# Patient Record
Sex: MALE | Race: WHITE | NOT HISPANIC OR LATINO | Employment: OTHER | URBAN - METROPOLITAN AREA
[De-identification: names, ages, dates, MRNs, and addresses within clinical notes are randomized per-mention and may not be internally consistent; named-entity substitution may affect disease eponyms.]

---

## 2017-07-29 ENCOUNTER — HOSPITAL ENCOUNTER (EMERGENCY)
Facility: CLINIC | Age: 72
Discharge: HOME | End: 2017-07-29
Attending: PHYSICIAN ASSISTANT | Admitting: PHYSICIAN ASSISTANT
Payer: COMMERCIAL

## 2017-07-29 VITALS
RESPIRATION RATE: 16 BRPM | SYSTOLIC BLOOD PRESSURE: 143 MMHG | BODY MASS INDEX: 28.14 KG/M2 | DIASTOLIC BLOOD PRESSURE: 70 MMHG | OXYGEN SATURATION: 97 % | WEIGHT: 190 LBS | TEMPERATURE: 98.8 F | HEIGHT: 69 IN

## 2017-07-29 DIAGNOSIS — S41.111A ARM LACERATION, RIGHT, INITIAL ENCOUNTER: ICD-10-CM

## 2017-07-29 PROCEDURE — 99283 EMERGENCY DEPT VISIT LOW MDM: CPT | Mod: 25

## 2017-07-29 PROCEDURE — 12002 RPR S/N/AX/GEN/TRNK2.6-7.5CM: CPT

## 2017-07-29 PROCEDURE — 90471 IMMUNIZATION ADMIN: CPT

## 2017-07-29 PROCEDURE — 25000128 H RX IP 250 OP 636

## 2017-07-29 PROCEDURE — 90715 TDAP VACCINE 7 YRS/> IM: CPT

## 2017-07-29 ASSESSMENT — ENCOUNTER SYMPTOMS
NUMBNESS: 0
WOUND: 1

## 2017-07-29 NOTE — ED PROVIDER NOTES
"  History     Chief Complaint:  Laceration     HPI   Kt Cornell is a 71 year old male who presents to the emergency department today for evaluation of a laceration. Just prior to arrival, the patient was changing a toilet when it broke cutting the patient's right forearm. The area bleeding immediately, however, is somewhat controlled with direct pressure. He is on a baby aspirin, but no other anticoagulation medications. He denies any pain and numbness or tingling in his fingers. Of note, he is unsure when his last tetanus was.     Allergies:  Penicillins     Medications:    Aspirin     Past Medical History:    History reviewed. No pertinent past medical history.    Past Surgical History:    History reviewed. No pertinent past surgical history.    Family History:    History reviewed. No pertinent family history.     Social History:  The patient was accompanied to the ED by his wife.  Marital Status:        Review of Systems   Skin: Positive for wound (laceration right wrist).   Neurological: Negative for numbness.   All other systems reviewed and are negative.    Physical Exam   First Vitals:  BP: 143/70  Heart Rate: 75  Temp: 98.8  F (37.1  C)  Resp: 16  Height: 175.3 cm (5' 9\")  Weight: 86.2 kg (190 lb)  SpO2: 97 %      Physical Exam  General: Resting comfortably on the gurney.    Resp:  Non-labored breathing. No tachypnea.   CV:  Radial pulses 2+ on the right     Capillary refill less than two seconds right fingers.    MS:  5/5 strength with right , wrist flexion and extension, finger flexion and extension at the MCP, PIP and DIP joints.      Normal ROM with right wrist flexion and extension, finger flexion and extension at the MCP, PIP and DIP joints.     No tenderness with palpation.   Neuro:  Awake and alert.     Sensation intact to light touch in the median, ulnar and radial nerve distributions as well as distal to the injury.    Skin:  Right forearm, flexor aspect, there is a 5 cm linear " laceration with active bleeding.    Psych: Normal affect. Appropriate interactions.      Emergency Department Course     Procedures:     Laceration Repair      LACERATION:  A simple clean 5 cm laceration.    LOCATION:  Right forearm     FUNCTION:  Distally sensation, circulation, motor and tendon function are intact.    ANESTHESIA:  Local using 1% Lidocaine with Epi total of 4 mLs.    PREPARATION:  Irrigation and Scrubbing with Shur Clens and Normal Saline.    DEBRIDEMENT:  no debridement.    CLOSURE:  Wound was closed with One Layer.  Skin closed with 21 x 5.0 Ethylon using interrupted sutures.     Interventions:  1457 Tdap 0.5mLs IM      Emergency Department Course:  Nursing notes and vitals reviewed.  1400: I performed an exam of the patient as documented above.   1454: Laceration repair completed.   I discussed the treatment plan with the patient. They expressed understanding of this plan and consented to discharge. They will be discharged home with instructions for care and follow up. In addition, the patient will return to the emergency department if their symptoms persist, worsen, if new symptoms arise or if there is any concern.  All questions were answered.    Impression & Plan      Medical Decision Making:  Kt presented with a laceration.  The wound was carefully evaluated and explored.  The laceration was closed as noted in the procedure note.  There is no evidence of muscular, tendon, bone, or nerve damage with this laceration.  Possible complications (infection, scarring) were reviewed with the patient.  Follow up with primary care will be indicated for suture removal as noted in the discharge instructions. I discussed the results, plan and any additional questions with the patient and his wife. They verbalized understanding and agreement with the plan.  We discussed reasons to return to the ED including signs of infection (warmth, redness, drainage, fever).      Diagnosis:    ICD-10-CM    1. Arm  laceration, right, initial encounter S41.111A      Disposition:  Discharged to home    Scribe Disclosure:  I, Christina Erickson, am serving as a scribe at 1:54 PM on 7/29/2017 to document services personally performed by Viktoria Lake MD based on my observations and the provider's statements to me.     7/29/2017    EMERGENCY DEPARTMENT       Viktoria Lake PAROB  07/29/17 [id number]

## 2017-07-29 NOTE — ED AVS SNAPSHOT
Emergency Department    [address] [facility] [address]    Phone:  [phone]    Fax:  [phone]                                       Kt Cornell   MRN: [id number]    Department:   Emergency Department   Date of Visit:  7/29/2017           Patient Information     Date Of Birth          1945        Your diagnoses for this visit were:     Arm laceration, right, initial encounter        You were seen by Viktoria Lake PA-C.      Follow-up Information     Follow up with Ady Randall MD In 10 days.    Specialty:  Internal Medicine    Contact information:    [facility] [address]  [phone]          Follow up with  Emergency Department.    Specialty:  EMERGENCY MEDICINE    Why:  If symptoms worsen    Contact information:    [address] [facility] [address] [phone]        Discharge Instructions       Keep the lacerations clean.  You may wash with soap and water and apply neosporin/bacitracin.  No soaking or swimming.    Acetaminophen and/or Ibuprofen as directed as needed for pain.  Follow-up with your doctor for suture removal in ~10-14 days.    Return if any symptoms of infection including fever, worsening pain, foul-smelling drainage, spreading redness or warmth or worse in any way.     Discharge Instructions  Laceration (Cut)    You were seen today for a laceration (cut).  Your doctor examined your laceration for any problems such a buried foreign body (like glass, a splinter, or gravel), or injury to blood vessels, tendons, and nerves.  Your doctor may have also rinsed and/or scrubbed your laceration to help prevent an infection.  Your laceration may have been closed with glue, staples or sutures (stitches).      It may not be possible to find all problems with your laceration on the first visit, and we can't always prevent infections.  Antibiotics are only given when the benefit is more than the risk,  and don't prevent all infections. Some lacerations are too high risk to close, and are left open to heal.  All lacerations, no matter how expertly repaired, will cause scarring.    Return to the Emergency Department right away if:    You have more redness, swelling, pain, drainage (pus), a bad smell, or red streaking from your laceration.      You have a fever of 101oF or more.    You have bleeding that you can t stop at home. If your cut starts to bleed, hold pressure on the bleeding area with a clean cloth or put pressure over the bandage.  If the bleeding doesn t stop after using constant pressure for 30 minutes, you should return to the Emergency Department for further treatment.    An area past the laceration is cool, pale, or blue compared with the other side, or has a slower return of color when squeezed.    Your dressing seems too tight or starts to get uncomfortable or painful.    You have loss of normal function or use of an area, such as being unable to straighten or bend a finger normally.    You have a numb area past the laceration.    Return to the Emergency Department or see your regular doctor if:    The laceration starts to come open.     You have something coming out of the cut or a feeling that there is something in the laceration.    Your wound will not heal, or keeps breaking open. There can always be glass, wood, dirt or other things in any wound.  They won t always show up, even on x-rays.  If a wound doesn t heal, this may be why, and it is important to follow-up with your regular doctor.    Home Care:    Take your dressing off in 12 hours, or as instructed by your doctor, to check your laceration. Remove the dressing sooner if it seems too tight or painful, or if it is getting numb, tingly, or pale past the dressing.    Gently wash your laceration 2 times a day with clean cloth and soap.     It is okay to shower, but do not let the laceration soak in water.      If your laceration was closed  "with wound adhesive or strips: pat it dry and leave it open to the air.     For all other repairs: after you wash your laceration, or at least 2 times a day, apply bacitracin or other antibiotic ointment to the laceration, then cover it with a Band-Aid  or gauze.    Keep the laceration clean. Wear gloves or other protective clothing if you are around dirt.    Follow-up:    Your sutures or staples need to be removed in 10-14 days. Schedule an appointment with your regular doctor to have this done.    Scars:  To help minimize scarring:    Wear sunscreen over the healed laceration when out in the sun.    Massage the area regularly.    You may use Vitamin E oil.    Wait a year.  Most scars will start to fade within a year.    Probiotics: If you have been given an antibiotic, you may want to also take a probiotic pill or eat yogurt with live cultures. Probiotics have \"good bacteria\" to help your intestines stay healthy. Studies have shown that probiotics help prevent diarrhea and other intestine problems (including C. diff infection) when you take antibiotics. You can buy these without a prescription in the pharmacy section of the store.     If you were given a prescription for medicine here today, be sure to read all of the information (including the package insert) that comes with your prescription.  This will include important information about the medicine, its side effects, and any warnings that you need to know about.  The pharmacist who fills the prescription can provide more information and answer questions you may have about the medicine.  If you have questions or concerns that the pharmacist cannot address, please call or return to the Emergency Department.         Remember that you can always come back to the Emergency Department if you are not able to see your regular doctor in the amount of time listed above, if you get any new symptoms, or if there is anything that worries you.          24 Hour Appointment " Hotline       To make an appointment at any [facility], call [phone] ([phone]). If you don't have a family doctor or clinic, we will help you find one. [facility] are conveniently located to serve the needs of you and your family.             Review of your medicines      Notice     You have not been prescribed any medications.            Orders Needing Specimen Collection     None      Pending Results     No orders found from 7/27/2017 to 7/30/2017.            Pending Culture Results     No orders found from 7/27/2017 to 7/30/2017.            Pending Results Instructions     If you had any lab results that were not finalized at the time of your Discharge, you can call the ED Lab Result RN at [phone]. You will be contacted by this team for any positive Lab results or changes in treatment. The nurses are available 7 days a week from 10A to 6:30P.  You can leave a message 24 hours per day and they will return your call.        Test Results From Your Hospital Stay               Clinical Quality Measure: Blood Pressure Screening     Your blood pressure was checked while you were in the emergency department today. The last reading we obtained was  BP: 143/70 . Please read the guidelines below about what these numbers mean and what you should do about them.  If your systolic blood pressure (the top number) is less than 120 and your diastolic blood pressure (the bottom number) is less than 80, then your blood pressure is normal. There is nothing more that you need to do about it.  If your systolic blood pressure (the top number) is 120-139 or your diastolic blood pressure (the bottom number) is 80-89, your blood pressure may be higher than it should be. You should have your blood pressure rechecked within a year by a primary care provider.  If your systolic blood pressure (the top number) is 140 or greater or your diastolic blood pressure (the bottom number) is 90 or greater, you may have high  "blood pressure. High blood pressure is treatable, but if left untreated over time it can put you at risk for heart attack, stroke, or kidney failure. You should have your blood pressure rechecked by a primary care provider within the next 4 weeks.  If your provider in the emergency department today gave you specific instructions to follow-up with your doctor or provider even sooner than that, you should follow that instruction and not wait for up to 4 weeks for your follow-up visit.        Thank you for choosing [facility]       Thank you for choosing [facility] for your care. Our goal is always to provide you with excellent care. Hearing back from our patients is one way we can continue to improve our services. Please take a few minutes to complete the written survey that you may receive in the mail after you visit with us. Thank you!        [employer] Information     [employer] lets you send messages to your doctor, view your test results, renew your prescriptions, schedule appointments and more. To sign up, go to [email or web address] . Click on \"Log in\" on the left side of the screen, which will take you to the Welcome page. Then click on \"Sign up Now\" on the right side of the page.     You will be asked to enter the access code listed below, as well as some personal information. Please follow the directions to create your username and password.     Your access code is: [email or web address]  Expires: 10/27/2017  3:01 PM     Your access code will  in 90 days. If you need help or a new code, please call your [facility] clinic or [phone].        Care EveryWhere ID     This is your Care EveryWhere ID. This could be used by other organizations to access your [facility] medical records  [id number]        Equal Access to Services     YUDITH GARRETT : rosa Matta, tuan reynoso. So [facility] [phone].    ATENCIÓN: Si henryla espjennifer chappell " mcguire disposición servicios gratuitos de asistencia lingüística. Llame al [phone].    We comply with applicable federal civil rights laws and [place] laws. We do not discriminate on the basis of race, color, national origin, age, disability sex, sexual orientation or gender identity.            After Visit Summary       This is your record. Keep this with you and show to your community pharmacist(s) and doctor(s) at your next visit.

## 2017-07-29 NOTE — DISCHARGE INSTRUCTIONS
Keep the lacerations clean.  You may wash with soap and water and apply neosporin/bacitracin.  No soaking or swimming.    Acetaminophen and/or Ibuprofen as directed as needed for pain.  Follow-up with your doctor for suture removal in ~10-14 days.    Return if any symptoms of infection including fever, worsening pain, foul-smelling drainage, spreading redness or warmth or worse in any way.     Discharge Instructions  Laceration (Cut)    You were seen today for a laceration (cut).  Your doctor examined your laceration for any problems such a buried foreign body (like glass, a splinter, or gravel), or injury to blood vessels, tendons, and nerves.  Your doctor may have also rinsed and/or scrubbed your laceration to help prevent an infection.  Your laceration may have been closed with glue, staples or sutures (stitches).      It may not be possible to find all problems with your laceration on the first visit, and we can't always prevent infections.  Antibiotics are only given when the benefit is more than the risk, and don't prevent all infections. Some lacerations are too high risk to close, and are left open to heal.  All lacerations, no matter how expertly repaired, will cause scarring.    Return to the Emergency Department right away if:    You have more redness, swelling, pain, drainage (pus), a bad smell, or red streaking from your laceration.      You have a fever of 101oF or more.    You have bleeding that you can t stop at home. If your cut starts to bleed, hold pressure on the bleeding area with a clean cloth or put pressure over the bandage.  If the bleeding doesn t stop after using constant pressure for 30 minutes, you should return to the Emergency Department for further treatment.    An area past the laceration is cool, pale, or blue compared with the other side, or has a slower return of color when squeezed.    Your dressing seems too tight or starts to get uncomfortable or painful.    You have loss of  "normal function or use of an area, such as being unable to straighten or bend a finger normally.    You have a numb area past the laceration.    Return to the Emergency Department or see your regular doctor if:    The laceration starts to come open.     You have something coming out of the cut or a feeling that there is something in the laceration.    Your wound will not heal, or keeps breaking open. There can always be glass, wood, dirt or other things in any wound.  They won t always show up, even on x-rays.  If a wound doesn t heal, this may be why, and it is important to follow-up with your regular doctor.    Home Care:    Take your dressing off in 12 hours, or as instructed by your doctor, to check your laceration. Remove the dressing sooner if it seems too tight or painful, or if it is getting numb, tingly, or pale past the dressing.    Gently wash your laceration 2 times a day with clean cloth and soap.     It is okay to shower, but do not let the laceration soak in water.      If your laceration was closed with wound adhesive or strips: pat it dry and leave it open to the air.     For all other repairs: after you wash your laceration, or at least 2 times a day, apply bacitracin or other antibiotic ointment to the laceration, then cover it with a Band-Aid  or gauze.    Keep the laceration clean. Wear gloves or other protective clothing if you are around dirt.    Follow-up:    Your sutures or staples need to be removed in 10-14 days. Schedule an appointment with your regular doctor to have this done.    Scars:  To help minimize scarring:    Wear sunscreen over the healed laceration when out in the sun.    Massage the area regularly.    You may use Vitamin E oil.    Wait a year.  Most scars will start to fade within a year.    Probiotics: If you have been given an antibiotic, you may want to also take a probiotic pill or eat yogurt with live cultures. Probiotics have \"good bacteria\" to help your intestines stay " healthy. Studies have shown that probiotics help prevent diarrhea and other intestine problems (including C. diff infection) when you take antibiotics. You can buy these without a prescription in the pharmacy section of the store.     If you were given a prescription for medicine here today, be sure to read all of the information (including the package insert) that comes with your prescription.  This will include important information about the medicine, its side effects, and any warnings that you need to know about.  The pharmacist who fills the prescription can provide more information and answer questions you may have about the medicine.  If you have questions or concerns that the pharmacist cannot address, please call or return to the Emergency Department.         Remember that you can always come back to the Emergency Department if you are not able to see your regular doctor in the amount of time listed above, if you get any new symptoms, or if there is anything that worries you.

## 2017-07-29 NOTE — ED AVS SNAPSHOT
Emergency Department    [address] [facility] [address]    Phone:  [phone]    Fax:  [phone]                                       Kt Cornell   MRN: [id number]    Department:   Emergency Department   Date of Visit:  7/29/2017           After Visit Summary Signature Page     I have received my discharge instructions, and my questions have been answered. I have discussed any challenges I see with this plan with the nurse or doctor.    ..........................................................................................................................................  Patient/Patient Representative Signature      ..........................................................................................................................................  Patient Representative Print Name and Relationship to Patient    ..................................................               ................................................  Date                                            Time    ..........................................................................................................................................  Reviewed by Signature/Title    ...................................................              ..............................................  Date                                                            Time

## 2018-04-19 ENCOUNTER — TRANSFERRED RECORDS (OUTPATIENT)
Dept: HEALTH INFORMATION MANAGEMENT | Facility: CLINIC | Age: 73
End: 2018-04-19

## 2018-04-19 ENCOUNTER — MEDICAL CORRESPONDENCE (OUTPATIENT)
Dept: HEALTH INFORMATION MANAGEMENT | Facility: CLINIC | Age: 73
End: 2018-04-19

## 2018-06-16 NOTE — TELEPHONE ENCOUNTER
FUTURE VISIT INFORMATION      FUTURE VISIT INFORMATION:    Date: 06/26/18    Time: 145pm    Location: [facility]  REFERRAL INFORMATION:    Referring provider:  REYNOLD BARON    Referring providers clinic:  [facility]    Reason for visit/diagnosis  ectropian rll    RECORDS REQUESTED FROM:       Clinic name Comments Records Status Imaging Status   [facility] Notes in HIM In HIM                   Patent

## 2018-06-26 ENCOUNTER — OFFICE VISIT (OUTPATIENT)
Dept: OPHTHALMOLOGY | Facility: CLINIC | Age: 73
End: 2018-06-26
Payer: COMMERCIAL

## 2018-06-26 ENCOUNTER — PRE VISIT (OUTPATIENT)
Dept: OPHTHALMOLOGY | Facility: CLINIC | Age: 73
End: 2018-06-26

## 2018-06-26 VITALS — HEIGHT: 70 IN | BODY MASS INDEX: 27.92 KG/M2 | WEIGHT: 195 LBS

## 2018-06-26 DIAGNOSIS — H02.112 CICATRICIAL ECTROPION OF RIGHT LOWER EYELID: Primary | ICD-10-CM

## 2018-06-26 ASSESSMENT — VISUAL ACUITY
OS_SC+: -3
METHOD: SNELLEN - LINEAR
OD_CC: 20/20
OS_CC: 20/40
OS_SC: 20/20
CORRECTION_TYPE: GLASSES

## 2018-06-26 ASSESSMENT — TONOMETRY
OS_IOP_MMHG: 11
OD_IOP_MMHG: 11
IOP_METHOD: ICARE

## 2018-06-26 ASSESSMENT — EXTERNAL EXAM - RIGHT EYE: OD_EXAM: NORMAL

## 2018-06-26 ASSESSMENT — SLIT LAMP EXAM - LIDS: COMMENTS: NORMAL

## 2018-06-26 ASSESSMENT — EXTERNAL EXAM - LEFT EYE: OS_EXAM: NORMAL

## 2018-06-26 NOTE — NURSING NOTE
Chief Complaints and History of Present Illnesses   Patient presents with     Ectropion Evaluation     HPI    Affected eye(s):  Right   Location:  Lower   Symptoms:     No blurred vision   Tearing (Comment: occasionally per pt but feels it is nothing out of the ordinary )   Dryness   No itching   No burning         Do you have eye pain now?:  No      Comments:    Patient notes he had a growth removed from the RLL about 1 year ago and since that sx he has had RLL issues. Patient notes he has been having a pain in the RE that comes and goes that feels like a muscle ache x a couple months.    Yulissa Posey June 26, 2018 12:52 PM

## 2018-06-26 NOTE — MR AVS SNAPSHOT
After Visit Summary   2018    Kt Cornell    MRN: [id number]           Patient Information     Date Of Birth          1945        Visit Information        Provider Department      2018 1:45 PM Lili Osman MD [facility] Ophthalmology        Today's Diagnoses     Cicatricial ectropion of right lower eyelid    -  1       Follow-ups after your visit        Follow-up notes from your care team     Return for 2 weeks procedure 45 minutes ok at [facility].      Your next 10 appointments already scheduled     2018 12:45 PM CDT   PROCEDURE with Lili Osman MD   [facility] ([facility])    [address] [address] [place] [address] [phone]              Who to contact     Please call your clinic at [phone] to:    Ask questions about your health    Make or cancel appointments    Discuss your medicines    Learn about your test results    Speak to your doctor            Additional Information About Your Visit        MyChart Information     MobileGlobe is an electronic gateway that provides easy, online access to your medical records. With MobileGlobe, you can request a clinic appointment, read your test results, renew a prescription or communicate with your care team.     To sign up for Virtual Computert visit the website at [email or web address]   You will be asked to enter the access code listed below, as well as some personal information. Please follow the directions to create your username and password.     Your access code is: [email or web address]  Expires: 9/10/2018  6:30 AM     Your access code will  in 90 days. If you need help or a new code, please contact your [facility] or call [phone] for assistance.        Care EveryWhere ID     This is your Care EveryWhere ID. This could be used by other organizations to access your [facility] medical records  [id number]        Your Vitals Were     Height BMI (Body  "Mass Index)                1.765 m (5' 9.5\") 28.38 kg/m2           Blood Pressure from Last 3 Encounters:   07/29/17 143/70    Weight from Last 3 Encounters:   06/26/18 88.5 kg (195 lb)   07/29/17 86.2 kg (190 lb)              We Performed the Following     External Photos OU (both eyes)          Today's Medication Changes          These changes are accurate as of 6/26/18  1:56 PM.  If you have any questions, ask your nurse or doctor.               Stop taking these medicines if you haven't already. Please contact your care team if you have questions.     SERTRALINE HCL PO   Stopped by:  Lili Osman MD                    Primary Care Provider Office Phone # Fax #    Ady Randall [phone]       [facility] [address]        Equal Access to Services     [facility]: Eliseo Cortez, rosa colon, renee foote, tuan adler . So [facility] [phone].    ATENCIÓN: Si habla español, tiene a mcguire disposición servicios gratuitos de asistencia lingüística. Llame al [phone].    We comply with applicable federal civil rights laws and [place] laws. We do not discriminate on the basis of race, color, national origin, age, disability, sex, sexual orientation, or gender identity.            Thank you!     Thank you for choosing [facility] OPHTHALMOLOGY  for your care. Our goal is always to provide you with excellent care. Hearing back from our patients is one way we can continue to improve our services. Please take a few minutes to complete the written survey that you may receive in the mail after your visit with us. Thank you!             Your Updated Medication List - Protect others around you: Learn how to safely use, store and throw away your medicines at www.disposemymeds.org.          This list is accurate as of 6/26/18  1:56 PM.  Always use your most recent med list.                   Brand Name " Dispense Instructions for use Diagnosis    GLIPIZIDE PO           METFORMIN HCL PO

## 2018-06-26 NOTE — LETTER
2018         RE:  :  MRN: Kt Cornell  1945  [id number]     Dear Isabel Chapa,    Thank you for asking me to see your patient, Kt Cornell, for an oculoplastic   consultation.  My assessment and plan are below.  For further details, please see my attached clinic note.      Chief Complaints and History of Present Illnesses   Patient presents with     Ectropion Evaluation      [facility]  S/P bilateral upper lid blepharoplasty. During this procedure, patient reports that he had a eyelid lesion removed from his lower eyelid about a year ago and then slowly developed this lower eyelid turning outward.   Reports that he has pain at times, irritation at times, and tearing at times.  Patient is on baby aspirin and uses CPAP.     Assessment & Plan        Kt Cornell is a 72 year old male with the following diagnoses:   1. Cicatricial ectropion of right lower eyelid       PLAN:  Lateral tarsal strip, right lower eyelid         Danis Hebert MD  PGY-2 Ophthalmology  Agree with above  Discussed options. It is possible if not likely he will need an FTSG, but he is willing to try lts and if fails proceed with FTSG.      OK with in clinic procedure.   Right lower lid LTS ectropion repair. Use PDS.     Again, thank you for allowing me to participate in the care of your patient.      Sincerely,    Lili Osman MD  Department of Ophthalmology and Visual Neurosciences  [facility]    CC: Isabel Chapa, LUCINA  [facility]  [facility] [address]  VIA Mail

## 2018-06-26 NOTE — PROGRESS NOTES
Chief Complaints and History of Present Illnesses   Patient presents with     Ectropion Evaluation     [facility].  S/P bilateral upper lid blepharoplasty. During this procedure, patient reports that he had a eyelid lesion removed from his lower eyelid about a year ago and then slowly developed this lower eyelid turning outward.   Reports that he has pain at times, irritation at times, and tearing at times.  Patient is on baby aspirin and uses CPAP.       Kt Cornell is a 72 year old male with the following diagnoses:   1. Cicatricial ectropion of right lower eyelid      PLAN:  Lateral tarsal strip, right lower eyelid        Danis Hebert MD  PGY-2 Ophthalmology  Agree with above  Discussed options. It is possible if not likely he will need an FTSG, but he is willing to try lts and if fails proceed with FTSG.     OK with in clinic procedure.   Right lower lid LTS ectropion repair. Use PDS.     Attending Physician Attestation:  Complete documentation of historical and exam elements from today's encounter can be found in the full encounter summary report (not reduplicated in this progress note).  I personally obtained the chief complaint(s) and history of present illness.  I confirmed and edited as necessary the review of systems, past medical/surgical history, family history, social history, and examination findings as documented by others; and I examined the patient myself.  I personally reviewed the relevant tests, images, and reports as documented above.  I formulated and edited as necessary the assessment and plan and discussed the findings and management plan with the patient and family. I personally reviewed the ophthalmic test(s) associated with this encounter, agree with the interpretation(s) as documented by the resident/fellow, and have edited the corresponding report(s) as necessary. - Lili Osman MD

## 2018-07-11 ENCOUNTER — OFFICE VISIT (OUTPATIENT)
Dept: OPHTHALMOLOGY | Facility: CLINIC | Age: 73
End: 2018-07-11
Payer: COMMERCIAL

## 2018-07-11 DIAGNOSIS — H02.112 CICATRICIAL ECTROPION OF RIGHT LOWER EYELID: Primary | ICD-10-CM

## 2018-07-11 PROCEDURE — 67917 REPAIR EYELID DEFECT: CPT | Mod: RT | Performed by: OPHTHALMOLOGY

## 2018-07-11 NOTE — PROGRESS NOTES
PREOPERATIVE DIAGNOSIS: Right lower eyelid ectropion  POSTOPERATIVE DIAGNOSIS: Right lower eyelid ectropion  PROCEDURE:  Right lower eyelid ectropion repair by tarsal strip procedure   ANESTHESIA: 1% lidocaine with epinephrine  SURGEON: Lili Osman MD.  ASSISTANT: None  COMPLICATIONS: None.   ESTIMATED BLOOD LOSS: Less than 5 mL.   HISTORY: Kt Cornell  presented with tearing  due to lower lid ectropion. The ectropion had previously been repaired with recurrence. We discussed if it fails repair, he may require a skin graft. After the risks, benefits and alternatives to the proposed procedure were explained, informed consent was obtained.   DESCRIPTION OF PROCEDURE: Kt Cornell was brought to the operating room and placed supine on the operating table.he lower lids and lateral canthal areas were infiltrated with local anesthetic. The area was prepped and draped in the typical fashion. Attention was directed to the right side. Lateral canthotomy and inferior cantholysis was performed with Benita scissors. A lateral tarsal strip was fashioned with the high temperature cautery and the benita scissors. The tarsal strip was secured to the lateral orbital rim periosteum with a double-armed 5-0 PDS suture in a horizontal mattress fashion. Lateral canthal angle was closed with a gray line to gray line suture of 5-0 Vicryl tied internally.The patient tolerated the procedure well. Antibiotic ointment was applied to the incisions. Kt Cronell left the operating room in stable condition.   Lili Osman MD

## 2018-07-11 NOTE — PATIENT INSTRUCTIONS
Post-operative Instructions  Ophthalmic Plastic and Reconstructive Surgery    Lili Osman M.D.     All instructions apply to the operated eye(s) or eyelid(s).    Wound care and personal care  ? If a patch or bandage has been placed, please leave this in place until seen by your physician. Ensure that the bandage does not get wet when you take a shower.  ? Apply ice compresses 15 minutes on 15 minutes off while awake for 2 days, then switch to warm water compresses 4 times a day until seen by your physician. For warm packs you can place a cup of dry uncooked rice in a clean cotton sock. Then place sock in microwave 30 seconds to one minute. Next place the warm sock into a plastic bag and wrap the bag with clean warm wet washcloth and place over operated eye.    ? You may shower or wash your hair the day after surgery. Do not bathe or go swimming for 1 week to prevent contamination of your wounds.  ? Do not apply make-up to the eyes or eyelids for 2 weeks after surgery.  ? Expect some swelling, bruising, black eye (even into the lower eyelids and cheeks). Also expect serum caking, crusting and discharge from the eye and/or incisions. A small amount of surface bleeding is normal for the first 48 hours.  ? Your eye(s) and eyelid(s) may be painful and tender. This is normal after surgery.  Use the pain medication as prescribed. If your pain does not improve despite the  medication, contact the office.    Contact information and follow-up  ? Return to the Eye Clinic for a follow-up appointment with your physician as  scheduled. If no appointment has been scheduled:   - [facility] eye clinic: [phone] for an appointment with Dr. Osman within 1 to 2 weeks from your date of surgery.   -  [facility] eye clinic: [phone] for an appointment with Dr. Osman within 1 to 2 weeks from your date of surgery.     ? For severe pain, bleeding, or loss of vision, call the [facility]  [place] [facility] at [phone] or [facility] at [phone].     After hours or on weekends and holidays, call [phone] and ask to speak with the ophthalmologist on call.    An on call person can be reached after hours for concerns. The on call doctor should not call in medication refill requests after hours or on weekends, so please plan accordingly. An effort has been made to provide adequate pain medications following every surgery, and refills will not be provided in most instances. Narcotic pain medications cannot be called in.     Activity restrictions and driving  ? Avoid heavy lifting, bending, exercise or strenuous activity for 1 week after surgery.  You may resume other activities and return to work as tolerated.  ? You may not resume driving until have you stopped using narcotic pain medications (such as Norco, Percocet, Tylenol #3).    Medications  ? Restart all your regular home medications and eye drops. If you take Plavix or  Aspirin on a regular basis, wait for 72 hours after your surgery before restarting these in order to decrease the risk of bleeding complications.  ? Avoid aspirin and aspirin-like medications (Motrin, Aleve, Ibuprofen, Valarie-  [place] etc) for 72 hours to reduce the risk of bleeding. You may take Tylenol  (acetaminophen) for pain.  ? In addition to your home medications, take the following post-operative medications as prescribed by your physician.    ? Apply antibiotic ointment to all sutures three times a day.

## 2018-07-11 NOTE — MR AVS SNAPSHOT
After Visit Summary   7/11/2018    Kt Cornell    MRN: [id number]           Patient Information     Date Of Birth          1945        Visit Information        Provider Department      7/11/2018 12:45 PM Lili Osman MD; PROC  [facility]        Today's Diagnoses     Cicatricial ectropion of right lower eyelid    -  1      Care Instructions    Post-operative Instructions  Ophthalmic Plastic and Reconstructive Surgery    Lili Osman M.D.     All instructions apply to the operated eye(s) or eyelid(s).    Wound care and personal care  ? If a patch or bandage has been placed, please leave this in place until seen by your physician. Ensure that the bandage does not get wet when you take a shower.  ? Apply ice compresses 15 minutes on 15 minutes off while awake for 2 days, then switch to warm water compresses 4 times a day until seen by your physician. For warm packs you can place a cup of dry uncooked rice in a clean cotton sock. Then place sock in microwave 30 seconds to one minute. Next place the warm sock into a plastic bag and wrap the bag with clean warm wet washcloth and place over operated eye.    ? You may shower or wash your hair the day after surgery. Do not bathe or go swimming for 1 week to prevent contamination of your wounds.  ? Do not apply make-up to the eyes or eyelids for 2 weeks after surgery.  ? Expect some swelling, bruising, black eye (even into the lower eyelids and cheeks). Also expect serum caking, crusting and discharge from the eye and/or incisions. A small amount of surface bleeding is normal for the first 48 hours.  ? Your eye(s) and eyelid(s) may be painful and tender. This is normal after surgery.  Use the pain medication as prescribed. If your pain does not improve despite the  medication, contact the office.    Contact information and follow-up  ? Return to the Eye Clinic for a follow-up appointment with your physician  as  scheduled. If no appointment has been scheduled:   - [facility] eye clinic: [phone] for an appointment with Dr. Osman within 1 to 2 weeks from your date of surgery.   -  [facility] eye clinic: [phone] for an appointment with Dr. Osman within 1 to 2 weeks from your date of surgery.     ? For severe pain, bleeding, or loss of vision, call the [facility] at [phone] or [facility] at [phone].     After hours or on weekends and holidays, call [phone] and ask to speak with the ophthalmologist on call.    An on call person can be reached after hours for concerns. The on call doctor should not call in medication refill requests after hours or on weekends, so please plan accordingly. An effort has been made to provide adequate pain medications following every surgery, and refills will not be provided in most instances. Narcotic pain medications cannot be called in.     Activity restrictions and driving  ? Avoid heavy lifting, bending, exercise or strenuous activity for 1 week after surgery.  You may resume other activities and return to work as tolerated.  ? You may not resume driving until have you stopped using narcotic pain medications (such as Norco, Percocet, Tylenol #3).    Medications  ? Restart all your regular home medications and eye drops. If you take Plavix or  Aspirin on a regular basis, wait for 72 hours after your surgery before restarting these in order to decrease the risk of bleeding complications.  ? Avoid aspirin and aspirin-like medications (Motrin, Aleve, Ibuprofen, Valarie-  [place] etc) for 72 hours to reduce the risk of bleeding. You may take Tylenol  (acetaminophen) for pain.  ? In addition to your home medications, take the following post-operative medications as prescribed by your physician.    ? Apply antibiotic ointment to all sutures three times a day.            Follow-ups after your visit         Your next 10 appointments already scheduled     2018 11:30 AM CDT   Return Visit with Lili Osman MD   [facility]    [address] [place] [address] [phone]              Who to contact     If you have questions or need follow up information about today's clinic visit or your schedule please contact [facility] directly at [phone].  Normal or non-critical lab and imaging results will be communicated to you by MyChart, letter or phone within 4 business days after the clinic has received the results. If you do not hear from us within 7 days, please contact the clinic through MyChart or phone. If you have a critical or abnormal lab result, we will notify you by phone as soon as possible.  Submit refill requests through SkimaTalk or call your pharmacy and they will forward the refill request to us. Please allow 3 business days for your refill to be completed.          Additional Information About Your Visit        SkimaTalk Information     SkimaTalk is an electronic gateway that provides easy, online access to your medical records. With SkimaTalk, you can request a clinic appointment, read your test results, renew a prescription or communicate with your care team.     To sign up for SkimaTalk visit the website at [email or web address]   You will be asked to enter the access code listed below, as well as some personal information. Please follow the directions to create your username and password.     Your access code is: [email or web address]  Expires: 9/10/2018  6:30 AM     Your access code will  in 90 days. If you need help or a new code, please contact your [facility] or call [phone] for assistance.        Care EveryWhere ID     This is your Care EveryWhere ID. This could be used by other organizations to access your [facility] medical records  [id number]         Blood Pressure from  Last 3 Encounters:   07/29/17 143/70    Weight from Last 3 Encounters:   06/26/18 88.5 kg (195 lb)   07/29/17 86.2 kg (190 lb)              We Performed the Following     Ectropion repair        Primary Care Provider Office Phone # Fax #    Ady Randall [phone]       [facility] [id number]        Equal Access to Services     YUDITH GARRETT : Hadii aad ku hadasho Soomaali, waaxda luqadaha, qaybta kaalmada adeegyada, waxay idiin hayaan adeeg fernyrachelmerrill lamike . So [facility] [phone].    ATENCIÓN: Si habla español, tiene a mcguire disposición servicios gratuitos de asistencia lingüística. Llame al [phone].    We comply with applicable federal civil rights laws and [place] laws. We do not discriminate on the basis of race, color, national origin, age, disability, sex, sexual orientation, or gender identity.            Thank you!     Thank you for choosing [facility]  for your care. Our goal is always to provide you with excellent care. Hearing back from our patients is one way we can continue to improve our services. Please take a few minutes to complete the written survey that you may receive in the mail after your visit with us. Thank you!             Your Updated Medication List - Protect others around you: Learn how to safely use, store and throw away your medicines at www.disposemymeds.org.          This list is accurate as of 7/11/18  1:11 PM.  Always use your most recent med list.                   Brand Name Dispense Instructions for use Diagnosis    GLIPIZIDE PO           METFORMIN HCL PO

## 2018-07-25 ENCOUNTER — OFFICE VISIT (OUTPATIENT)
Dept: OPHTHALMOLOGY | Facility: CLINIC | Age: 73
End: 2018-07-25
Payer: COMMERCIAL

## 2018-07-25 DIAGNOSIS — H02.112 CICATRICIAL ECTROPION OF RIGHT LOWER EYELID: Primary | ICD-10-CM

## 2018-07-25 PROCEDURE — 99024 POSTOP FOLLOW-UP VISIT: CPT | Performed by: OPHTHALMOLOGY

## 2018-07-25 ASSESSMENT — VISUAL ACUITY
OS_SC: 20/25
OD_SC: 20/25
METHOD: SNELLEN - LINEAR

## 2018-07-25 NOTE — MR AVS SNAPSHOT
After Visit Summary   7/25/2018    Kt Cornell    MRN: [id number]           Patient Information     Date Of Birth          1945        Visit Information        Provider Department      7/25/2018 11:30 AM Lili Osman MD [facility]        Today's Diagnoses     Cicatricial ectropion of right lower eyelid    -  1       Follow-ups after your visit        Follow-up notes from your care team     Return if symptoms worsen or fail to improve.      Who to contact     If you have questions or need follow up information about today's clinic visit or your schedule please contact [facility] directly at [phone].  Normal or non-critical lab and imaging results will be communicated to you by MyChart, letter or phone within 4 business days after the clinic has received the results. If you do not hear from us within 7 days, please contact the clinic through MyChart or phone. If you have a critical or abnormal lab result, we will notify you by phone as soon as possible.  Submit refill requests through BioIQ or call your pharmacy and they will forward the refill request to us. Please allow 3 business days for your refill to be completed.          Additional Information About Your Visit        Care EveryWhere ID     This is your Care EveryWhere ID. This could be used by other organizations to access your [facility] medical records  [id number]         Blood Pressure from Last 3 Encounters:   07/29/17 143/70    Weight from Last 3 Encounters:   06/26/18 88.5 kg (195 lb)   07/29/17 86.2 kg (190 lb)              Today, you had the following     No orders found for display       Primary Care Provider Office Phone # Fax #    Ady Randall [phone]       [facility] [address]        Equal Access to Services     YUDITH GARRETT : rosa Matta qaybta kaalmada adeegyada, waxay idiin  iwona isaacsinocencio laantonioargelia arpit. So [facility] [phone].    ATENCIÓN: Si habla español, tiene a mcguire disposición servicios gratuitos de asistencia lingüística. Kyung al [phone].    We comply with applicable federal civil rights laws and [place] laws. We do not discriminate on the basis of race, color, national origin, age, disability, sex, sexual orientation, or gender identity.            Thank you!     Thank you for choosing [facility]  for your care. Our goal is always to provide you with excellent care. Hearing back from our patients is one way we can continue to improve our services. Please take a few minutes to complete the written survey that you may receive in the mail after your visit with us. Thank you!             Your Updated Medication List - Protect others around you: Learn how to safely use, store and throw away your medicines at www.disposemymeds.org.          This list is accurate as of 7/25/18 12:12 PM.  Always use your most recent med list.                   Brand Name Dispense Instructions for use Diagnosis    GLIPIZIDE PO           METFORMIN HCL PO

## 2018-07-25 NOTE — NURSING NOTE
Patient presents with:  Post Op (Ophthalmology) Right Eye: Right lower eyelid ectropion on 7/11/18. using Emycin oint, using ofloxacin in right eye [facility]      Referring Provider:  No referring provider defined for this encounter.    HPI    Affected eye(s):  Right   Location:  Lower   Symptoms:     Puffy eyes   Redness   No tearing   No burning   No photophobia   No eye discharge         Do you have eye pain now?:  No      Comments:     Post Op (Ophthalmology) Right Eye: Right lower eyelid ectropion on 7/11/18. using Emycin oint, using ofloxacin in right eye [facility]                 Michelle RUSH. OSC

## 2018-07-25 NOTE — PROGRESS NOTES
Kt Cornell is status post right lower eyelid ectropion repair.  Incision(s) healing well.  Had discharge and went to [facility] - given ofloxacin drops. Resolved.  The lid(s)  is  in excellent position.    I have recommended:  * Continue antibiotic ointment or bland lubricating ointment (eg vaseline or aquaphor) to the incision site BID.  * Finish 2 week course of ofloxacin drops (only 3 more days).   * Warm soaks 3-4x daily until all edema and ecchymoses resolve  return to clinic as needed - looks great, due to cicatrix I had been concerned may not be able to correct enough.     Attending Physician Attestation:  Complete documentation of historical and exam elements from today's encounter can be found in the full encounter summary report (not reduplicated in this progress note).  I personally obtained the chief complaint(s) and history of present illness.  I confirmed and edited as necessary the review of systems, past medical/surgical history, family history, social history, and examination findings as documented by others; and I examined the patient myself.  I personally reviewed the relevant tests, images, and reports as documented above.  I formulated and edited as necessary the assessment and plan and discussed the findings and management plan with the patient and family. - Lili Osman MD

## 2020-10-27 ENCOUNTER — RECORDS - HEALTHEAST (OUTPATIENT)
Dept: ADMINISTRATIVE | Facility: OTHER | Age: 75
End: 2020-10-27

## 2020-12-02 ENCOUNTER — AMBULATORY - HEALTHEAST (OUTPATIENT)
Dept: LAB | Facility: CLINIC | Age: 75
End: 2020-12-02

## 2020-12-02 ENCOUNTER — HOSPITAL ENCOUNTER (OUTPATIENT)
Dept: RESPIRATORY THERAPY | Facility: CLINIC | Age: 75
Discharge: HOME | End: 2020-12-02

## 2020-12-02 DIAGNOSIS — C34.90 MALIGNANT NEOPLASM OF LUNG, UNSPECIFIED LATERALITY, UNSPECIFIED PART OF LUNG (H): ICD-10-CM

## 2020-12-02 DIAGNOSIS — E11.9 DIABETES MELLITUS (H): ICD-10-CM

## 2020-12-02 DIAGNOSIS — C34.90 LUNG CANCER (H): ICD-10-CM

## 2020-12-02 LAB
ALBUMIN SERPL-MCNC: 4.4 G/DL (ref 3.5–5)
ALBUMIN UR-MCNC: NEGATIVE MG/DL
ALP SERPL-CCNC: 54 U/L (ref 45–120)
ALT SERPL W P-5'-P-CCNC: 28 U/L (ref 0–45)
ANION GAP SERPL CALCULATED.3IONS-SCNC: 10 MMOL/L (ref 5–18)
APPEARANCE UR: CLEAR
AST SERPL W P-5'-P-CCNC: 18 U/L (ref 0–40)
BILIRUB SERPL-MCNC: 0.9 MG/DL (ref 0–1)
BILIRUB UR QL STRIP: NEGATIVE
BUN SERPL-MCNC: 11 MG/DL (ref 8–28)
CALCIUM SERPL-MCNC: 9.1 MG/DL (ref 8.5–10.5)
CHLORIDE BLD-SCNC: 107 MMOL/L (ref 98–107)
CO2 SERPL-SCNC: 25 MMOL/L (ref 22–31)
COLOR UR AUTO: NORMAL
CREAT SERPL-MCNC: 0.93 MG/DL (ref 0.7–1.3)
GFR SERPL CREATININE-BSD FRML MDRD: >60 ML/MIN/1.73M2
GLUCOSE BLD-MCNC: 153 MG/DL (ref 70–125)
GLUCOSE UR STRIP-MCNC: NEGATIVE MG/DL
HGB UR QL STRIP: NEGATIVE
KETONES UR STRIP-MCNC: NEGATIVE MG/DL
LEUKOCYTE ESTERASE UR QL STRIP: NEGATIVE
NITRATE UR QL: NEGATIVE
PH UR STRIP: 6 [PH] (ref 4.5–8)
POTASSIUM BLD-SCNC: 3.9 MMOL/L (ref 3.5–5)
PROT SERPL-MCNC: 7.5 G/DL (ref 6–8)
SODIUM SERPL-SCNC: 142 MMOL/L (ref 136–145)
SP GR UR STRIP: 1.01 (ref 1–1.03)
UROBILINOGEN UR STRIP-ACNC: NORMAL

## 2021-06-13 NOTE — PROGRESS NOTES
RESPIRATORY CARE NOTE     Patient Name: Kt Cornell  Today's Date: 12/2/2020     Problem List  There is no problem list on file for this patient.            Patient had back extrapolated volumes on pre FVC's only. Post FVC met ATTS standards. Albuterol neb given, patient left in no distress              Karina Barbosa

## 2023-04-18 ENCOUNTER — ANCILLARY ORDERS (OUTPATIENT)
Dept: PET IMAGING | Facility: CLINIC | Age: 78
End: 2023-04-18

## 2023-04-18 DIAGNOSIS — C61 MALIGNANT NEOPLASM OF PROSTATE (H): ICD-10-CM

## 2023-05-05 ENCOUNTER — HOSPITAL ENCOUNTER (OUTPATIENT)
Dept: PET IMAGING | Facility: CLINIC | Age: 78
Discharge: HOME | End: 2023-05-05
Attending: HOSPITALIST | Admitting: HOSPITALIST
Payer: COMMERCIAL

## 2023-05-05 DIAGNOSIS — C61 MALIGNANT NEOPLASM OF PROSTATE (H): ICD-10-CM

## 2023-05-05 PROCEDURE — A9596 HC RX 343: HCPCS | Performed by: RADIOLOGY

## 2023-05-05 PROCEDURE — 78815 PET IMAGE W/CT SKULL-THIGH: CPT | Mod: PS

## 2023-05-05 PROCEDURE — 343N000001 HC RX 343: Performed by: RADIOLOGY

## 2023-05-05 PROCEDURE — 78815 PET IMAGE W/CT SKULL-THIGH: CPT | Mod: 26 | Performed by: RADIOLOGY

## 2023-05-05 RX ADMIN — KIT FOR THE PREPARATION OF GALLIUM GA 68 GOZETOTIDE INJECTION 5.6 MILLICURIE: KIT INTRAVENOUS at 12:41
